# Patient Record
Sex: MALE | Race: BLACK OR AFRICAN AMERICAN | NOT HISPANIC OR LATINO
[De-identification: names, ages, dates, MRNs, and addresses within clinical notes are randomized per-mention and may not be internally consistent; named-entity substitution may affect disease eponyms.]

---

## 2024-06-14 ENCOUNTER — DASHBOARD ENCOUNTERS (OUTPATIENT)
Age: 79
End: 2024-06-14

## 2024-06-19 ENCOUNTER — OFFICE VISIT (OUTPATIENT)
Dept: URBAN - METROPOLITAN AREA CLINIC 92 | Facility: CLINIC | Age: 79
End: 2024-06-19

## 2024-06-19 RX ORDER — AMLODIPINE BESYLATE 5 MG/1
TAKE 1 TABLET (5 MG) BY ORAL ROUTE ONCE DAILY TABLET ORAL 1
Qty: 0 | Refills: 0 | COMMUNITY
Start: 1900-01-01

## 2024-06-19 RX ORDER — ROSUVASTATIN CALCIUM 20 MG/1
TAKE 1 TABLET (20 MG) BY ORAL ROUTE ONCE DAILY TABLET, FILM COATED ORAL 1
Qty: 0 | Refills: 0 | COMMUNITY
Start: 1900-01-01

## 2024-07-11 ENCOUNTER — OFFICE VISIT (OUTPATIENT)
Dept: URBAN - METROPOLITAN AREA CLINIC 92 | Facility: CLINIC | Age: 79
End: 2024-07-11

## 2024-07-11 RX ORDER — ROSUVASTATIN CALCIUM 20 MG/1
TAKE 1 TABLET (20 MG) BY ORAL ROUTE ONCE DAILY TABLET, FILM COATED ORAL 1
Qty: 0 | Refills: 0 | COMMUNITY
Start: 1900-01-01

## 2024-07-11 RX ORDER — AMLODIPINE BESYLATE 5 MG/1
TAKE 1 TABLET (5 MG) BY ORAL ROUTE ONCE DAILY TABLET ORAL 1
Qty: 0 | Refills: 0 | COMMUNITY
Start: 1900-01-01

## 2024-08-08 ENCOUNTER — OFFICE VISIT (OUTPATIENT)
Dept: URBAN - METROPOLITAN AREA CLINIC 92 | Facility: CLINIC | Age: 79
End: 2024-08-08

## 2024-08-12 ENCOUNTER — OFFICE VISIT (OUTPATIENT)
Dept: URBAN - METROPOLITAN AREA CLINIC 92 | Facility: CLINIC | Age: 79
End: 2024-08-12

## 2024-08-12 RX ORDER — ROSUVASTATIN CALCIUM 20 MG/1
TAKE 1 TABLET (20 MG) BY ORAL ROUTE ONCE DAILY TABLET, FILM COATED ORAL 1
Qty: 0 | Refills: 0 | COMMUNITY
Start: 1900-01-01

## 2024-08-12 RX ORDER — AMLODIPINE BESYLATE 5 MG/1
TAKE 1 TABLET (5 MG) BY ORAL ROUTE ONCE DAILY TABLET ORAL 1
Qty: 0 | Refills: 0 | COMMUNITY
Start: 1900-01-01

## 2024-08-12 NOTE — HPI-TODAY'S VISIT:
79-year-old male patient presents today due to iron deficiency anemia.  He was referred to us by Dr. Chan Vigil copy of this note be sent to referring provider.  Per PCPs note patient has history with chronic colitis last colonoscopy in 2019 no labs were received from them.  He has a past medical history of emphysema, cataracts, prostate cancer, CAD seen cardio currently on Plavix. He has been seen by Dr. Estrada in the past due to iron deficiency anemia. Colonoscopy 2016 demonstrated active colitis in the transverse colon often seeing and resolving acute self-limited colitis.  Colonoscopy 2019 demonstrated erythema in the rectum, sigmoid, descending and ascending colon biopsies demonstrated chronic active colitis on random colon biopsy.  He followed up after however did not receive medications for colitis but was scheduled for an endoscopy due to anemia

## 2024-09-17 ENCOUNTER — OFFICE VISIT (OUTPATIENT)
Dept: URBAN - METROPOLITAN AREA CLINIC 92 | Facility: CLINIC | Age: 79
End: 2024-09-17

## 2024-09-17 RX ORDER — ROSUVASTATIN CALCIUM 20 MG/1
TAKE 1 TABLET (20 MG) BY ORAL ROUTE ONCE DAILY TABLET, FILM COATED ORAL 1
Qty: 0 | Refills: 0 | COMMUNITY
Start: 1900-01-01

## 2024-09-17 RX ORDER — AMLODIPINE BESYLATE 5 MG/1
TAKE 1 TABLET (5 MG) BY ORAL ROUTE ONCE DAILY TABLET ORAL 1
Qty: 0 | Refills: 0 | COMMUNITY
Start: 1900-01-01

## 2024-09-17 NOTE — PHYSICAL EXAM CONSTITUTIONAL:
well developed, well nourished , in no acute distress , ambulating without difficulty , normal communication ability Additional Area 2 Units: 12

## 2024-09-17 NOTE — HPI-TODAY'S VISIT:
Patient is a 79 year old male who presents for a colon cancer screening. Last colonoscopy. There is no family history of colon polyps or colon cancer. Patient denies a change in bowelhabits, appetite, and weight. Patient denies abdominal pain, constipation,diarrhea, hematochezia, or melena. Denies upper GI issues. Denies NSAID use. Patient denies any cardiac, lung, or kidney issues. No pacemaker/defibrillator, No blood thinner, No home O2. No dialysis.   Patient was previously seen in 202 by Dr. Estrada. For iron deficiency anemia. Since he had a colonoscopy a year prior an EGD was ordered but never done. Colonoscopy 1/25/19 with Dr. Denton revealed congested mucosa in rectum, sigmoid, and descending, random colon bx reveal chronic active colitis, neg. for dysplasia, 5 yr repeat

## 2024-11-07 ENCOUNTER — OFFICE VISIT (OUTPATIENT)
Dept: URBAN - METROPOLITAN AREA CLINIC 92 | Facility: CLINIC | Age: 79
End: 2024-11-07
Payer: COMMERCIAL

## 2024-11-07 VITALS
HEIGHT: 68 IN | WEIGHT: 160 LBS | TEMPERATURE: 96.6 F | SYSTOLIC BLOOD PRESSURE: 116 MMHG | DIASTOLIC BLOOD PRESSURE: 66 MMHG | HEART RATE: 64 BPM | BODY MASS INDEX: 24.25 KG/M2

## 2024-11-07 DIAGNOSIS — D50.9 IRON DEFICIENCY ANEMIA, UNSPECIFIED IRON DEFICIENCY ANEMIA TYPE: ICD-10-CM

## 2024-11-07 PROBLEM — 87522002: Status: ACTIVE | Noted: 2024-11-07

## 2024-11-07 PROCEDURE — 99203 OFFICE O/P NEW LOW 30 MIN: CPT

## 2024-11-07 RX ORDER — TAMSULOSIN HYDROCHLORIDE 0.4 MG/1
CAPSULE ORAL
Qty: 90 CAPSULE | Status: ACTIVE | COMMUNITY

## 2024-11-07 RX ORDER — ROSUVASTATIN CALCIUM 20 MG/1
TAKE 1 TABLET (20 MG) BY ORAL ROUTE ONCE DAILY TABLET, FILM COATED ORAL 1
Qty: 0 | Refills: 0 | Status: ACTIVE | COMMUNITY
Start: 1900-01-01

## 2024-11-07 RX ORDER — ALBUTEROL SULFATE 90 UG/1
AEROSOL, METERED RESPIRATORY (INHALATION)
Qty: 8.5 GRAM | Status: ACTIVE | COMMUNITY

## 2024-11-07 RX ORDER — AMLODIPINE BESYLATE 5 MG/1
TAKE 1 TABLET (5 MG) BY ORAL ROUTE ONCE DAILY TABLET ORAL 1
Qty: 0 | Refills: 0 | Status: ACTIVE | COMMUNITY
Start: 1900-01-01

## 2024-11-07 NOTE — HPI-TODAY'S VISIT:
Patient is a 79-year-old male who presents today due to iron deficiency anemia.  He was referred to us by Dr. Froilan Carrero. Copy of this note be sent to referring provider.  Per PCPs note patient has history with chronic colitis last colonoscopy in 2019.  He has a past medical history of emphysema, cataracts, prostate cancer, CAD seen cardio currenlty on Aspirin.   He has been seen by Dr. Estrada in 2020 due to iron deficiency anemia. Colonoscopy 2016 demonstrated active colitis in the transverse colon often seeing and resolving acute self-limited colitis.  Colonoscopy 2019 demonstrated erythema in the rectum, sigmoid, descending and ascending colon biopsies demonstrated chronic active colitis on random colon biopsy.  He followed up after however did not receive medications for colitis but was scheduled for an endoscopy due to anemia which was never done.   There is no family history of colon polyps or colon cancer. Patient denies a change in bowel habits, appetite, and weight. Patient denies abdominal pain, constipation, diarrhea, hematochezia, or melena. Patient has a daiyl BM. Denies upper GI issues. Currently on Aspirin daily. Patient has COPD and CAD. Denies any kidney issues. Patient urinates frequently and will be seeing his PCP tomorrow in f/u. No pacemaker/defibrillator, No blood thinner, No home O2. No dialysis.

## 2024-12-04 ENCOUNTER — TELEPHONE ENCOUNTER (OUTPATIENT)
Dept: URBAN - METROPOLITAN AREA CLINIC 92 | Facility: CLINIC | Age: 79
End: 2024-12-04

## 2024-12-06 LAB
ABSOLUTE BASOPHILS: 29
ABSOLUTE EOSINOPHILS: 228
ABSOLUTE LYMPHOCYTES: 1716
ABSOLUTE MONOCYTES: 564
ABSOLUTE NEUTROPHILS: 3164
BASOPHILS: 0.5
EOSINOPHILS: 4
FERRITIN, SERUM: 65
HEMATOCRIT: 39.4
HEMOGLOBIN: 13
IRON BIND.CAP.(TIBC): 261
IRON SATURATION: 38
IRON: 98
LYMPHOCYTES: 30.1
MCH: 28
MCHC: 33
MCV: 84.7
MONOCYTES: 9.9
MPV: 10.6
NEUTROPHILS: 55.5
PLATELET COUNT: 280
RDW: 13.1
RED BLOOD CELL COUNT: 4.65
WHITE BLOOD CELL COUNT: 5.7

## 2025-02-10 ENCOUNTER — TELEPHONE ENCOUNTER (OUTPATIENT)
Dept: URBAN - METROPOLITAN AREA CLINIC 17 | Facility: CLINIC | Age: 80
End: 2025-02-10

## 2025-02-10 ENCOUNTER — OFFICE VISIT (OUTPATIENT)
Dept: URBAN - METROPOLITAN AREA SURGERY CENTER 16 | Facility: SURGERY CENTER | Age: 80
End: 2025-02-10

## 2025-02-10 RX ORDER — TAMSULOSIN HYDROCHLORIDE 0.4 MG/1
CAPSULE ORAL
Qty: 90 CAPSULE | Status: ACTIVE | COMMUNITY

## 2025-02-10 RX ORDER — ALBUTEROL SULFATE 90 UG/1
AEROSOL, METERED RESPIRATORY (INHALATION)
Qty: 8.5 GRAM | Status: ACTIVE | COMMUNITY

## 2025-02-10 RX ORDER — AMLODIPINE BESYLATE 5 MG/1
TAKE 1 TABLET (5 MG) BY ORAL ROUTE ONCE DAILY TABLET ORAL 1
Qty: 0 | Refills: 0 | Status: ACTIVE | COMMUNITY
Start: 1900-01-01

## 2025-02-10 RX ORDER — POLYETHYLENE GLYCOL-3350 AND ELECTROLYTES WITH FLAVOR PACK 240; 5.84; 2.98; 6.72; 22.72 G/278.26G; G/278.26G; G/278.26G; G/278.26G; G/278.26G
4000 ML POWDER, FOR SOLUTION ORAL ONCE A DAY
Qty: 4000 ML | Refills: 0 | OUTPATIENT
Start: 2025-02-11 | End: 2025-02-12

## 2025-02-10 RX ORDER — ROSUVASTATIN CALCIUM 20 MG/1
TAKE 1 TABLET (20 MG) BY ORAL ROUTE ONCE DAILY TABLET, FILM COATED ORAL 1
Qty: 0 | Refills: 0 | Status: ACTIVE | COMMUNITY
Start: 1900-01-01

## 2025-02-27 ENCOUNTER — OFFICE VISIT (OUTPATIENT)
Dept: URBAN - METROPOLITAN AREA CLINIC 92 | Facility: CLINIC | Age: 80
End: 2025-02-27

## 2025-04-18 ENCOUNTER — OFFICE VISIT (OUTPATIENT)
Dept: URBAN - METROPOLITAN AREA SURGERY CENTER 30 | Facility: SURGERY CENTER | Age: 80
End: 2025-04-18

## 2025-05-08 ENCOUNTER — OFFICE VISIT (OUTPATIENT)
Dept: URBAN - METROPOLITAN AREA CLINIC 92 | Facility: CLINIC | Age: 80
End: 2025-05-08

## 2025-05-09 ENCOUNTER — TELEPHONE ENCOUNTER (OUTPATIENT)
Dept: URBAN - METROPOLITAN AREA CLINIC 17 | Facility: CLINIC | Age: 80
End: 2025-05-09

## 2025-05-09 ENCOUNTER — OFFICE VISIT (OUTPATIENT)
Dept: URBAN - METROPOLITAN AREA SURGERY CENTER 16 | Facility: SURGERY CENTER | Age: 80
End: 2025-05-09

## 2025-05-09 RX ORDER — ALBUTEROL SULFATE 90 UG/1
AEROSOL, METERED RESPIRATORY (INHALATION)
Qty: 8.5 GRAM | Status: ACTIVE | COMMUNITY

## 2025-05-09 RX ORDER — TAMSULOSIN HYDROCHLORIDE 0.4 MG/1
CAPSULE ORAL
Qty: 90 CAPSULE | Status: ACTIVE | COMMUNITY

## 2025-05-09 RX ORDER — ROSUVASTATIN CALCIUM 20 MG/1
TAKE 1 TABLET (20 MG) BY ORAL ROUTE ONCE DAILY TABLET, FILM COATED ORAL 1
Qty: 0 | Refills: 0 | Status: ACTIVE | COMMUNITY
Start: 1900-01-01

## 2025-05-09 RX ORDER — AMLODIPINE BESYLATE 5 MG/1
TAKE 1 TABLET (5 MG) BY ORAL ROUTE ONCE DAILY TABLET ORAL 1
Qty: 0 | Refills: 0 | Status: ACTIVE | COMMUNITY
Start: 1900-01-01

## 2025-05-28 ENCOUNTER — TELEPHONE ENCOUNTER (OUTPATIENT)
Dept: URBAN - METROPOLITAN AREA CLINIC 92 | Facility: CLINIC | Age: 80
End: 2025-05-28

## 2025-05-29 ENCOUNTER — OFFICE VISIT (OUTPATIENT)
Dept: URBAN - METROPOLITAN AREA CLINIC 92 | Facility: CLINIC | Age: 80
End: 2025-05-29